# Patient Record
Sex: MALE | Race: BLACK OR AFRICAN AMERICAN | NOT HISPANIC OR LATINO | URBAN - METROPOLITAN AREA
[De-identification: names, ages, dates, MRNs, and addresses within clinical notes are randomized per-mention and may not be internally consistent; named-entity substitution may affect disease eponyms.]

---

## 2017-03-25 ENCOUNTER — EMERGENCY (EMERGENCY)
Facility: HOSPITAL | Age: 22
LOS: 1 days | Discharge: HOME | End: 2017-03-25
Attending: EMERGENCY MEDICINE | Admitting: EMERGENCY MEDICINE
Payer: COMMERCIAL

## 2017-03-25 VITALS
TEMPERATURE: 98 F | SYSTOLIC BLOOD PRESSURE: 160 MMHG | RESPIRATION RATE: 15 BRPM | HEART RATE: 79 BPM | OXYGEN SATURATION: 978 % | DIASTOLIC BLOOD PRESSURE: 84 MMHG

## 2017-03-25 VITALS — OXYGEN SATURATION: 99 %

## 2017-03-25 DIAGNOSIS — M54.2 CERVICALGIA: ICD-10-CM
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
## 2017-03-25 DIAGNOSIS — F41.9 ANXIETY DISORDER, UNSPECIFIED: ICD-10-CM

## 2017-03-25 DIAGNOSIS — S30.810A ABRASION OF LOWER BACK AND PELVIS, INITIAL ENCOUNTER: ICD-10-CM
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
## 2017-03-25 DIAGNOSIS — S83.8X1A SPRAIN OF OTHER SPECIFIED PARTS OF RIGHT KNEE, INITIAL ENCOUNTER: ICD-10-CM

## 2017-03-25 DIAGNOSIS — S00.212A ABRASION OF LEFT EYELID AND PERIOCULAR AREA, INITIAL ENCOUNTER: ICD-10-CM

## 2017-03-25 PROCEDURE — 99283 EMERGENCY DEPT VISIT LOW MDM: CPT | Mod: 25

## 2017-03-25 PROCEDURE — 99283 EMERGENCY DEPT VISIT LOW MDM: CPT

## 2017-03-25 PROCEDURE — 73562 X-RAY EXAM OF KNEE 3: CPT | Mod: 26,RT

## 2017-03-25 PROCEDURE — 73562 X-RAY EXAM OF KNEE 3: CPT

## 2017-03-25 RX ORDER — ACETAMINOPHEN 500 MG
1000 TABLET ORAL ONCE
Qty: 0 | Refills: 0 | Status: COMPLETED | OUTPATIENT
Start: 2017-03-25 | End: 2017-03-25

## 2017-03-25 RX ORDER — IBUPROFEN 200 MG
600 TABLET ORAL ONCE
Qty: 0 | Refills: 0 | Status: COMPLETED | OUTPATIENT
Start: 2017-03-25 | End: 2017-03-25

## 2017-03-25 RX ADMIN — Medication 600 MILLIGRAM(S): at 18:36

## 2017-03-25 RX ADMIN — Medication 600 MILLIGRAM(S): at 18:46

## 2017-03-25 RX ADMIN — Medication 1000 MILLIGRAM(S): at 18:46

## 2017-03-25 RX ADMIN — Medication 1000 MILLIGRAM(S): at 18:36

## 2017-03-25 NOTE — ED PROVIDER NOTE - PHYSICAL EXAMINATION
Gen: NAD, AOx3, anxious  Head: NC, superficial abrasion left eye lid and inferior left eye, no bony ttp orbits or facial bones, jaw in line  HEENT: PERRL, oral mucosa moist, normal conjunctiva, neck supple  Lung: no respiratory distress  CV: Normal perfusion  Abd: soft, NTND  MSK: No edema, no visible deformities, +ttp rt patella no laxity or ttp of MCL/LCL. ACL intact. no trauma to Lt LE, pelvis stable, no midline vertebral ttp, FROM neck, +ttp left trapezius  Neuro: No focal neurologic deficits, faint tremor  Skin: superficial abrasion to rt palm, +abrasions b/l buttock posterior/superior to greater trochanters, +abrasion over rt knee cap, mild erythema of radial aspect of wrist- linear  Psych: normal affect

## 2017-03-25 NOTE — ED ADULT NURSE REASSESSMENT NOTE - NS ED NURSE REASSESS COMMENT FT1
report received from dorothy oropeza. pt resting comfortably in bed. safety maintained. family at bedside. pt states "the pain is much better". waiting on xray results. will continue to monitor.
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
## 2017-03-25 NOTE — ED PROVIDER NOTE - MEDICAL DECISION MAKING DETAILS
no suspicion of significant head/facial trauma. will r/o patella fx, tetanus UTD. pain control. Xray D/C

## 2017-03-25 NOTE — ED PROVIDER NOTE - CARE PLAN
Principal Discharge DX:	Abrasions of multiple sites  Instructions for follow-up, activity and diet:	1. Return to ED for worsening, progressive or any other concerning symptoms   2. Follow up with your primary care doctor in 2-3days   3. Take motrin 600mg every 6 hours as needed for pain and or Take Tylenol up to 650 mg every 6 hours as needed for pain.   4. Rest, apply ice over covered skin for no more than 15 minutes at a time, keep affected extremity elevated.  Secondary Diagnosis:	Sprain of other ligament of right knee, initial encounter

## 2017-03-25 NOTE — ED PROVIDER NOTE - PLAN OF CARE
1. Return to ED for worsening, progressive or any other concerning symptoms   2. Follow up with your primary care doctor in 2-3days   3. Take motrin 600mg every 6 hours as needed for pain and or Take Tylenol up to 650 mg every 6 hours as needed for pain.   4. Rest, apply ice over covered skin for no more than 15 minutes at a time, keep affected extremity elevated.
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
## 2017-05-04 ENCOUNTER — EMERGENCY (EMERGENCY)
Facility: HOSPITAL | Age: 22
LOS: 1 days | Discharge: HOME | End: 2017-05-04
Attending: EMERGENCY MEDICINE
Payer: COMMERCIAL

## 2017-05-04 VITALS
RESPIRATION RATE: 16 BRPM | DIASTOLIC BLOOD PRESSURE: 87 MMHG | SYSTOLIC BLOOD PRESSURE: 135 MMHG | OXYGEN SATURATION: 100 % | HEART RATE: 66 BPM

## 2017-05-04 VITALS
RESPIRATION RATE: 18 BRPM | TEMPERATURE: 98 F | HEART RATE: 74 BPM | SYSTOLIC BLOOD PRESSURE: 157 MMHG | DIASTOLIC BLOOD PRESSURE: 90 MMHG | OXYGEN SATURATION: 99 %

## 2017-05-04 DIAGNOSIS — F41.9 ANXIETY DISORDER, UNSPECIFIED: ICD-10-CM

## 2017-05-04 DIAGNOSIS — R06.02 SHORTNESS OF BREATH: ICD-10-CM

## 2017-05-04 DIAGNOSIS — R07.9 CHEST PAIN, UNSPECIFIED: ICD-10-CM

## 2017-05-04 LAB
AMPHET UR-MCNC: NEGATIVE — SIGNIFICANT CHANGE UP
ANION GAP SERPL CALC-SCNC: 12 MMOL/L — SIGNIFICANT CHANGE UP (ref 5–17)
APAP SERPL-MCNC: <15 UG/ML — SIGNIFICANT CHANGE UP (ref 10–30)
APPEARANCE UR: CLEAR — SIGNIFICANT CHANGE UP
BARBITURATES UR SCN-MCNC: NEGATIVE — SIGNIFICANT CHANGE UP
BASOPHILS # BLD AUTO: 0 K/UL — SIGNIFICANT CHANGE UP (ref 0–0.2)
BASOPHILS NFR BLD AUTO: 0.3 % — SIGNIFICANT CHANGE UP (ref 0–2)
BENZODIAZ UR-MCNC: POSITIVE
BILIRUB UR-MCNC: NEGATIVE — SIGNIFICANT CHANGE UP
BUN SERPL-MCNC: 11 MG/DL — SIGNIFICANT CHANGE UP (ref 7–23)
CALCIUM SERPL-MCNC: 9.8 MG/DL — SIGNIFICANT CHANGE UP (ref 8.4–10.5)
CHLORIDE SERPL-SCNC: 104 MMOL/L — SIGNIFICANT CHANGE UP (ref 96–108)
CO2 SERPL-SCNC: 25 MMOL/L — SIGNIFICANT CHANGE UP (ref 22–31)
COCAINE METAB.OTHER UR-MCNC: NEGATIVE — SIGNIFICANT CHANGE UP
COLOR SPEC: SIGNIFICANT CHANGE UP
CREAT SERPL-MCNC: 0.98 MG/DL — SIGNIFICANT CHANGE UP (ref 0.5–1.3)
DIFF PNL FLD: NEGATIVE — SIGNIFICANT CHANGE UP
EOSINOPHIL # BLD AUTO: 0 K/UL — SIGNIFICANT CHANGE UP (ref 0–0.5)
EOSINOPHIL NFR BLD AUTO: 0.4 % — SIGNIFICANT CHANGE UP (ref 0–6)
ETHANOL SERPL-MCNC: SIGNIFICANT CHANGE UP MG/DL (ref 0–10)
GLUCOSE SERPL-MCNC: 93 MG/DL — SIGNIFICANT CHANGE UP (ref 70–99)
GLUCOSE UR QL: NEGATIVE — SIGNIFICANT CHANGE UP
HCT VFR BLD CALC: 38.9 % — LOW (ref 39–50)
HGB BLD-MCNC: 13.2 G/DL — SIGNIFICANT CHANGE UP (ref 13–17)
KETONES UR-MCNC: NEGATIVE — SIGNIFICANT CHANGE UP
LEUKOCYTE ESTERASE UR-ACNC: NEGATIVE — SIGNIFICANT CHANGE UP
LYMPHOCYTES # BLD AUTO: 1.7 K/UL — SIGNIFICANT CHANGE UP (ref 1–3.3)
LYMPHOCYTES # BLD AUTO: 26.8 % — SIGNIFICANT CHANGE UP (ref 13–44)
MCHC RBC-ENTMCNC: 29.1 PG — SIGNIFICANT CHANGE UP (ref 27–34)
MCHC RBC-ENTMCNC: 33.9 GM/DL — SIGNIFICANT CHANGE UP (ref 32–36)
MCV RBC AUTO: 85.7 FL — SIGNIFICANT CHANGE UP (ref 80–100)
METHADONE UR-MCNC: NEGATIVE — SIGNIFICANT CHANGE UP
MONOCYTES # BLD AUTO: 0.5 K/UL — SIGNIFICANT CHANGE UP (ref 0–0.9)
MONOCYTES NFR BLD AUTO: 8.3 % — SIGNIFICANT CHANGE UP (ref 2–14)
NEUTROPHILS # BLD AUTO: 4 K/UL — SIGNIFICANT CHANGE UP (ref 1.8–7.4)
NEUTROPHILS NFR BLD AUTO: 64.2 % — SIGNIFICANT CHANGE UP (ref 43–77)
NITRITE UR-MCNC: NEGATIVE — SIGNIFICANT CHANGE UP
OPIATES UR-MCNC: NEGATIVE — SIGNIFICANT CHANGE UP
OXYCODONE UR-MCNC: NEGATIVE — SIGNIFICANT CHANGE UP
PCP SPEC-MCNC: SIGNIFICANT CHANGE UP
PCP UR-MCNC: NEGATIVE — SIGNIFICANT CHANGE UP
PH UR: 6.5 — SIGNIFICANT CHANGE UP (ref 5–8)
PLATELET # BLD AUTO: 252 K/UL — SIGNIFICANT CHANGE UP (ref 150–400)
POTASSIUM SERPL-MCNC: 3.9 MMOL/L — SIGNIFICANT CHANGE UP (ref 3.5–5.3)
POTASSIUM SERPL-SCNC: 3.9 MMOL/L — SIGNIFICANT CHANGE UP (ref 3.5–5.3)
PROT UR-MCNC: NEGATIVE — SIGNIFICANT CHANGE UP
RBC # BLD: 4.54 M/UL — SIGNIFICANT CHANGE UP (ref 4.2–5.8)
RBC # FLD: 11.4 % — SIGNIFICANT CHANGE UP (ref 10.3–14.5)
SALICYLATES SERPL-MCNC: <2 MG/DL — LOW (ref 15–30)
SODIUM SERPL-SCNC: 141 MMOL/L — SIGNIFICANT CHANGE UP (ref 135–145)
SP GR SPEC: 1.01 — SIGNIFICANT CHANGE UP (ref 1.01–1.02)
THC UR QL: POSITIVE
UROBILINOGEN FLD QL: NEGATIVE — SIGNIFICANT CHANGE UP
WBC # BLD: 6.2 K/UL — SIGNIFICANT CHANGE UP (ref 3.8–10.5)
WBC # FLD AUTO: 6.2 K/UL — SIGNIFICANT CHANGE UP (ref 3.8–10.5)

## 2017-05-04 PROCEDURE — 80307 DRUG TEST PRSMV CHEM ANLYZR: CPT

## 2017-05-04 PROCEDURE — 93005 ELECTROCARDIOGRAM TRACING: CPT

## 2017-05-04 PROCEDURE — 99284 EMERGENCY DEPT VISIT MOD MDM: CPT | Mod: 25

## 2017-05-04 PROCEDURE — 85027 COMPLETE CBC AUTOMATED: CPT

## 2017-05-04 PROCEDURE — 80048 BASIC METABOLIC PNL TOTAL CA: CPT

## 2017-05-04 PROCEDURE — 99283 EMERGENCY DEPT VISIT LOW MDM: CPT | Mod: 25

## 2017-05-04 PROCEDURE — 93010 ELECTROCARDIOGRAM REPORT: CPT

## 2017-05-04 PROCEDURE — 81003 URINALYSIS AUTO W/O SCOPE: CPT

## 2017-05-04 NOTE — ED ADULT TRIAGE NOTE - CHIEF COMPLAINT QUOTE
anxious, pt states he used to buy xanax "illegally" then became addicted and stopped. pt states he has been feeling anxious and bought a xanax yesterday, denies si/hi/hallucinations.

## 2017-05-04 NOTE — ED PROVIDER NOTE - DETAILS:
[I agree with scribe documentation except where as noted below]   21 yom pmhx anxiety (self-diagnosed,  has never formally been evaluated by a psychiatrist) presents with ongoing anxiety.  approx 1 year ago started buying xanax off the streets and was taking up to 7 tabs of xanax daily.  enrolled in an outpt treatment program ([facility] in [place]) and has been 'clean' for 3 months.  since stopping xanax has increased feelings of anxiety, difficulty speaking to people due to anxiety, and intermittent cp/sob when he is feeling anxious.  did buy xanax off the streets and take some yesterday because he was feeling more anxious than usual. presented to urgent care today requesting xanax rx for his anxiety as he feels the xanax helps him, and he would rather take 'legal' xanax than buy it off the streets per pt. no current cp or sob at this time. no fam hx of cad, nonsmoker. denies iv drug use, states xanax and marijuana are only drugs he is using at this time. no hx of withdrawal in the past,  has not used any xanax for 3 months until yesterday.    ROS:   constitutional - no fever, no chills  eyes - no visual changes, no redness  eent - no sore throat, no nasal congestion  cvs - + intermittent chest pain, no leg swelling  resp - no shortness of breath, no cough  gi - no abdominal pain, no vomiting, no diarrhea  gu - no dysuria, no hematuria  msk - no acute back pain, no joint swelling  skin - no rashes, no jaundice  neuro - no headache, no focal weakness  psych - + anxiety    ROS:   constitutional - no fever, no chills  eyes - no visual changes, no redness  eent - no sore throat, no nasal congestion  cvs - no chest pain, no leg swelling  resp - no shortness of breath, no cough  gi - no abdominal pain, no vomiting, no diarrhea  gu - no dysuria, no hematuria  msk - no acute back pain, no joint swelling  skin - no rashes, no jaundice  neuro - no headache, no focal weakness  psych - no acute mental health issue, mood and affect wnl, no SI or HI at this time. no tremors or tongue fasciculations    pt without any acute psychiatric issue at this time - no SI or HI, no acute severe anxiety. mood and affect appear wnl. pt w/o active cp at this time, doubt acs or cardiac cause of pt's chest pain and cp/sob are associated with episodes of anxiety. no signs or sxs of benzo withdrawal at this time. pt is requesting a rx for xanax at this time - counseled re need for formal psychiatric evaluation prior to administering medications for chronic anxiety particularly in setting of benzo abuse. discussed with psychiatry who recommended outpt referral to dual diagnosis clinic at [facility]. pt counseled re need [place] [facility] advised to discontinue illicit drug use. additional verbal instructions regarding diagnosis, return precautions and follow up plan given to pt and/or family. SYDNEY Dobbs MD

## 2017-05-04 NOTE — ED ADULT NURSE NOTE - OBJECTIVE STATEMENT
21y male pt, no PMH, arrived to ED c/o being anxious with episodes of sob and chest tightness. Pt states that he used to take Xanax illegally, has been following with clinic in order to stop taking Xanax. Pt was anxious yesterday and admit to take 1 dose of 2mg of Xanax yesterday. Pt went to [facility] today to get a prescription but was advised to come to ED for further evaluation. No SI/HI, appears anxious but cooperative, no other drug use, occasional marijuana use, no fever/chills no neuro deficits noted.

## 2017-05-04 NOTE — ED PROVIDER NOTE - INTERPRETATION
normal sinus rhythm, Normal axis, Normal VA interval and QRS complex. There are no acute ischemic ST or T-wave changes.

## 2017-05-04 NOTE — ED PROVIDER NOTE - NS ED MD SCRIBE ATTENDING SCRIBE SECTIONS
REVIEW OF SYSTEMS/PHYSICAL EXAM/HIV/HISTORY OF PRESENT ILLNESS/VITAL SIGNS( Pullset)/INTAKE ASSESSMENT/SCREENINGS/PAST MEDICAL/SURGICAL/SOCIAL HISTORY

## 2017-05-04 NOTE — ED PROVIDER NOTE - OBJECTIVE STATEMENT
21 year old male with no significant PMHx, presents with complaint of anxiety attacks. States anxiety attacks sometimes occur with episodes of SOB, CP, nervousness and difficulty speaking clearly. Episodes are relieved with Xanax. Pt admits to hx of Xanax abuse. Pt states episodes of anxiety attacks have been occurring since he stopped taking Xanax. Is in outpatient rehab currently and was clean for 3 months prior but took 1 2mg tab Xanax yesterday. Used to take up to 7 tabs a day prior. Pt does not have prescription for Xanax and has never been evaluated by a psychiatrist. States he buys it off the streets. Went to Urgent Care today to get a prescription but was advised to present to the ED. Denies SI/HI. No IV drug abuse. Admits to occasional marijuana use. Denies CP, SOB currently. No recent fevers, chills, N/V, abdominal pain, back pain, urinary changes.

## 2017-08-11 ENCOUNTER — INPATIENT (INPATIENT)
Facility: HOSPITAL | Age: 22
LOS: 0 days | DRG: 955 | End: 2017-08-12
Attending: SURGERY | Admitting: SURGERY
Payer: COMMERCIAL

## 2017-08-11 VITALS
RESPIRATION RATE: 16 BRPM | OXYGEN SATURATION: 100 % | DIASTOLIC BLOOD PRESSURE: 138 MMHG | HEART RATE: 90 BPM | SYSTOLIC BLOOD PRESSURE: 211 MMHG | TEMPERATURE: 98 F | WEIGHT: 160.94 LBS

## 2017-08-11 VITALS
DIASTOLIC BLOOD PRESSURE: 113 MMHG | HEART RATE: 100 BPM | OXYGEN SATURATION: 100 % | RESPIRATION RATE: 23 BRPM | SYSTOLIC BLOOD PRESSURE: 191 MMHG

## 2017-08-11 DIAGNOSIS — T14.90 INJURY, UNSPECIFIED: ICD-10-CM

## 2017-08-11 LAB
ALBUMIN SERPL ELPH-MCNC: 4.7 G/DL — SIGNIFICANT CHANGE UP (ref 3.3–5)
ALP SERPL-CCNC: 85 U/L — SIGNIFICANT CHANGE UP (ref 40–120)
ALT FLD-CCNC: 38 U/L RC — SIGNIFICANT CHANGE UP (ref 10–45)
AMYLASE P1 CFR SERPL: 68 U/L — SIGNIFICANT CHANGE UP (ref 25–125)
ANION GAP SERPL CALC-SCNC: 24 MMOL/L — HIGH (ref 5–17)
APPEARANCE UR: CLEAR — SIGNIFICANT CHANGE UP
APTT BLD: 39.5 SEC — HIGH (ref 27.5–37.4)
AST SERPL-CCNC: 54 U/L — HIGH (ref 10–40)
BASE EXCESS BLDV CALC-SCNC: -6.2 MMOL/L — LOW (ref -2–2)
BASOPHILS # BLD AUTO: 0 K/UL — SIGNIFICANT CHANGE UP (ref 0–0.2)
BASOPHILS NFR BLD AUTO: 0.4 % — SIGNIFICANT CHANGE UP (ref 0–2)
BILIRUB SERPL-MCNC: 0.3 MG/DL — SIGNIFICANT CHANGE UP (ref 0.2–1.2)
BILIRUB UR-MCNC: NEGATIVE — SIGNIFICANT CHANGE UP
BLD GP AB SCN SERPL QL: NEGATIVE — SIGNIFICANT CHANGE UP
BUN SERPL-MCNC: 21 MG/DL — SIGNIFICANT CHANGE UP (ref 7–23)
CA-I SERPL-SCNC: 1.18 MMOL/L — SIGNIFICANT CHANGE UP (ref 1.12–1.3)
CALCIUM SERPL-MCNC: 9.8 MG/DL — SIGNIFICANT CHANGE UP (ref 8.4–10.5)
CHLORIDE BLDV-SCNC: 105 MMOL/L — SIGNIFICANT CHANGE UP (ref 96–108)
CHLORIDE SERPL-SCNC: 101 MMOL/L — SIGNIFICANT CHANGE UP (ref 96–108)
CO2 BLDV-SCNC: 19 MMOL/L — LOW (ref 22–30)
CO2 SERPL-SCNC: 15 MMOL/L — LOW (ref 22–31)
COLOR SPEC: COLORLESS — SIGNIFICANT CHANGE UP
CREAT SERPL-MCNC: 1.27 MG/DL — SIGNIFICANT CHANGE UP (ref 0.5–1.3)
DIFF PNL FLD: ABNORMAL
EOSINOPHIL # BLD AUTO: 0 K/UL — SIGNIFICANT CHANGE UP (ref 0–0.5)
EOSINOPHIL NFR BLD AUTO: 0.5 % — SIGNIFICANT CHANGE UP (ref 0–6)
ETHANOL SERPL-MCNC: SIGNIFICANT CHANGE UP MG/DL (ref 0–10)
GAS PNL BLDA: SIGNIFICANT CHANGE UP
GAS PNL BLDV: 138 MMOL/L — SIGNIFICANT CHANGE UP (ref 136–145)
GAS PNL BLDV: SIGNIFICANT CHANGE UP
GLUCOSE BLDV-MCNC: 154 MG/DL — HIGH (ref 70–99)
GLUCOSE SERPL-MCNC: 175 MG/DL — HIGH (ref 70–99)
GLUCOSE UR QL: 50
HCO3 BLDV-SCNC: 18 MMOL/L — LOW (ref 21–29)
HCT VFR BLD CALC: 40.1 % — SIGNIFICANT CHANGE UP (ref 39–50)
HCT VFR BLDA CALC: 43 % — SIGNIFICANT CHANGE UP (ref 39–50)
HGB BLD CALC-MCNC: 14 G/DL — SIGNIFICANT CHANGE UP (ref 13–17)
HGB BLD-MCNC: 13.7 G/DL — SIGNIFICANT CHANGE UP (ref 13–17)
INR BLD: 1.27 RATIO — HIGH (ref 0.88–1.16)
KETONES UR-MCNC: NEGATIVE — SIGNIFICANT CHANGE UP
LACTATE BLDV-MCNC: 6.7 MMOL/L — CRITICAL HIGH (ref 0.7–2)
LEUKOCYTE ESTERASE UR-ACNC: NEGATIVE — SIGNIFICANT CHANGE UP
LIDOCAIN IGE QN: 22 U/L — SIGNIFICANT CHANGE UP (ref 7–60)
LYMPHOCYTES # BLD AUTO: 3.1 K/UL — SIGNIFICANT CHANGE UP (ref 1–3.3)
LYMPHOCYTES # BLD AUTO: 32.5 % — SIGNIFICANT CHANGE UP (ref 13–44)
MCHC RBC-ENTMCNC: 29.9 PG — SIGNIFICANT CHANGE UP (ref 27–34)
MCHC RBC-ENTMCNC: 34.2 GM/DL — SIGNIFICANT CHANGE UP (ref 32–36)
MCV RBC AUTO: 87.4 FL — SIGNIFICANT CHANGE UP (ref 80–100)
MONOCYTES # BLD AUTO: 0.7 K/UL — SIGNIFICANT CHANGE UP (ref 0–0.9)
MONOCYTES NFR BLD AUTO: 6.8 % — SIGNIFICANT CHANGE UP (ref 2–14)
NEUTROPHILS # BLD AUTO: 5.8 K/UL — SIGNIFICANT CHANGE UP (ref 1.8–7.4)
NEUTROPHILS NFR BLD AUTO: 59.8 % — SIGNIFICANT CHANGE UP (ref 43–77)
NITRITE UR-MCNC: NEGATIVE — SIGNIFICANT CHANGE UP
OTHER CELLS CSF MANUAL: 16 ML/DL — LOW (ref 18–22)
PCO2 BLDV: 35 MMHG — SIGNIFICANT CHANGE UP (ref 35–50)
PH BLDV: 7.34 — LOW (ref 7.35–7.45)
PH UR: 6.5 — SIGNIFICANT CHANGE UP (ref 5–8)
PLATELET # BLD AUTO: 242 K/UL — SIGNIFICANT CHANGE UP (ref 150–400)
PO2 BLDV: 52 MMHG — HIGH (ref 25–45)
POTASSIUM BLDV-SCNC: 3.1 MMOL/L — LOW (ref 3.5–5)
POTASSIUM SERPL-MCNC: 3.4 MMOL/L — LOW (ref 3.5–5.3)
POTASSIUM SERPL-SCNC: 3.4 MMOL/L — LOW (ref 3.5–5.3)
PROT SERPL-MCNC: 7.5 G/DL — SIGNIFICANT CHANGE UP (ref 6–8.3)
PROT UR-MCNC: 300 MG/DL
PROTHROM AB SERPL-ACNC: 13.9 SEC — HIGH (ref 9.8–12.7)
RBC # BLD: 4.59 M/UL — SIGNIFICANT CHANGE UP (ref 4.2–5.8)
RBC # FLD: 11 % — SIGNIFICANT CHANGE UP (ref 10.3–14.5)
RH IG SCN BLD-IMP: POSITIVE — SIGNIFICANT CHANGE UP
SAO2 % BLDV: 82 % — SIGNIFICANT CHANGE UP (ref 67–88)
SODIUM SERPL-SCNC: 140 MMOL/L — SIGNIFICANT CHANGE UP (ref 135–145)
SP GR SPEC: 1.02 — SIGNIFICANT CHANGE UP (ref 1.01–1.02)
UROBILINOGEN FLD QL: NEGATIVE — SIGNIFICANT CHANGE UP
WBC # BLD: 9.6 K/UL — SIGNIFICANT CHANGE UP (ref 3.8–10.5)
WBC # FLD AUTO: 9.6 K/UL — SIGNIFICANT CHANGE UP (ref 3.8–10.5)

## 2017-08-11 PROCEDURE — 72125 CT NECK SPINE W/O DYE: CPT | Mod: 26

## 2017-08-11 PROCEDURE — 72128 CT CHEST SPINE W/O DYE: CPT | Mod: 26

## 2017-08-11 PROCEDURE — 99291 CRITICAL CARE FIRST HOUR: CPT

## 2017-08-11 PROCEDURE — 99291 CRITICAL CARE FIRST HOUR: CPT | Mod: 25

## 2017-08-11 PROCEDURE — 74177 CT ABD & PELVIS W/CONTRAST: CPT | Mod: 26

## 2017-08-11 PROCEDURE — 70486 CT MAXILLOFACIAL W/O DYE: CPT | Mod: 26

## 2017-08-11 PROCEDURE — 71260 CT THORAX DX C+: CPT | Mod: 26

## 2017-08-11 PROCEDURE — 70450 CT HEAD/BRAIN W/O DYE: CPT | Mod: 26

## 2017-08-11 PROCEDURE — 88304 TISSUE EXAM BY PATHOLOGIST: CPT | Mod: 26

## 2017-08-11 PROCEDURE — 31500 INSERT EMERGENCY AIRWAY: CPT

## 2017-08-11 PROCEDURE — 72131 CT LUMBAR SPINE W/O DYE: CPT | Mod: 26

## 2017-08-11 PROCEDURE — 71010: CPT | Mod: 26

## 2017-08-11 RX ORDER — SODIUM CHLORIDE 9 MG/ML
1000 INJECTION INTRAMUSCULAR; INTRAVENOUS; SUBCUTANEOUS ONCE
Refills: 0 | Status: COMPLETED | OUTPATIENT
Start: 2017-08-11 | End: 2017-08-11

## 2017-08-11 RX ORDER — ETOMIDATE 2 MG/ML
20 INJECTION INTRAVENOUS ONCE
Refills: 0 | Status: COMPLETED | OUTPATIENT
Start: 2017-08-11 | End: 2017-08-11

## 2017-08-11 RX ORDER — MANNITOL
199.8 POWDER (GRAM) MISCELLANEOUS
Qty: 100 | Refills: 0 | Status: DISCONTINUED | OUTPATIENT
Start: 2017-08-11 | End: 2017-08-11

## 2017-08-11 RX ORDER — MORPHINE SULFATE 50 MG/1
4 CAPSULE, EXTENDED RELEASE ORAL ONCE
Refills: 0 | Status: DISCONTINUED | OUTPATIENT
Start: 2017-08-11 | End: 2017-08-11

## 2017-08-11 RX ORDER — ROCURONIUM BROMIDE 10 MG/ML
100 VIAL (ML) INTRAVENOUS ONCE
Refills: 0 | Status: COMPLETED | OUTPATIENT
Start: 2017-08-11 | End: 2017-08-11

## 2017-08-11 RX ADMIN — SODIUM CHLORIDE 1000 MILLILITER(S): 9 INJECTION INTRAMUSCULAR; INTRAVENOUS; SUBCUTANEOUS at 21:32

## 2017-08-11 RX ADMIN — MORPHINE SULFATE 4 MILLIGRAM(S): 50 CAPSULE, EXTENDED RELEASE ORAL at 21:31

## 2017-08-11 RX ADMIN — Medication 999 GM/HR: at 22:10

## 2017-08-11 RX ADMIN — Medication 100 MILLIGRAM(S): at 21:32

## 2017-08-11 RX ADMIN — ETOMIDATE 20 MILLIGRAM(S): 2 INJECTION INTRAVENOUS at 21:33

## 2017-08-11 NOTE — ED PROVIDER NOTE - PROGRESS NOTE DETAILS
ATTD: patient combative and confused. concern for his safety, projected course and ability to obtain sufficient imaging for concerned diagosis, patient was intubated for airway protection. ATTD: FAST scan done at bedside, no free fluid noted. transported to Ct for imaging.
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
## 2017-08-11 NOTE — ED PROCEDURE NOTE - PROCEDURE ADDITIONAL DETAILS
Patient preoxygenated with nasal canula and BVM. Positioned patient at head of bed. c-collar removed with in-line stabilization by surgical resident. Patient given 20mg etomidate and 100mg rocuronium. Utilizing glidescope 1st attempt unsuccessful. Airway suctioned. 2nd attempt successful with visualization of tube through cords. Verified with color change, b/l breath sounds, condensation in tube. Tube advanced 1cm after cxr, 26 at the teeth.
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
## 2017-08-11 NOTE — H&P ADULT - NSHPLABSRESULTS_GEN_ALL_CORE
VBG lactate 6.7  U/A +protein  -EtOH    CT:  Trace right pneumothorax  Large acute hematoma in the deep paraspinal subcutaneous   tissues of the lumbosacral region measuring approximately 15 x 13 x 3.3   cm (CC x TR x  AP). There are focal hyperdense areas within the hematoma   compatible with active contrast extravasation, although a bleeding vessel   was not identified.    CT Head official read pending;  8mm midline shift; frontal lobe contusion
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
## 2017-08-11 NOTE — H&P ADULT - NSHPPHYSICALEXAM_GEN_ALL_CORE
Constitutional: Well-developed well nourished male in distress; noted initially to move all extremities spontaneously prior to intubation/sedation  HEENT: Head is normocephalic; bogginess noted to posterior occiput, no scalp lacerations, maxillofacial structures stable, abrasion over right eyebrow, bloody discharge from oral cavity, no hernandez sign / racoon eyes, unable to assess EOM b/l, pupils 4 mm round and reactive to light b/l on initial exam, no active drainage or redness from eyes  Neck: cervical collar in place, trachea midline  Respiratory: Breath sounds CTA b/l respirations are unlabored, no accessory muscle use, no conversational dyspnea  Cardiovascular: Regular rate & rhythm, +S1, S2  Chest: Unable to assess chest wall tenderness, no subQ emphysema or crepitus palpated.  Gastrointestinal: Abdomen soft, non-tender, non-distended, no rebound tenderness / guarding, no ecchymosis or external signs of abdominal trauma  Extremities: Prior to intubation, moving all extremities spontaneously. Right anterior shoulder with hematoma and overlying abrasion.   Pelvis: stable  Vascular: 2+ radial, femoral, and DP pulses b/l  Neurological: GCS: 15 (4/5/6). A&O x 3; no gross sensory / motor / coordination deficits  Musculoskeletal: 5/5 strength of upper and lower extremities b/l  Back: no C/T/LS spine tenderness to palpation, no step-offs or signs of external trauma to the back Constitutional: Well-developed well nourished male in distress; noted initially to move all extremities spontaneously prior to intubation/sedation  HEENT: Head is normocephalic; bogginess noted to posterior occiput, no scalp lacerations, maxillofacial structures stable, abrasion over right eyebrow, bloody discharge from oral cavity, no hernandez sign / raccoon eyes, unable to assess EOM b/l, pupils 4 mm round and reactive to light b/l on initial exam, no active drainage or redness from eyes  Neck: cervical collar in place, trachea midline  Respiratory: Breath sounds CTA b/l respirations are unlabored, no accessory muscle use, no conversational dyspnea  Cardiovascular: Regular rate & rhythm, +S1, S2  Chest: Unable to assess chest wall tenderness, no subQ emphysema or crepitus palpated. No obvious deformities of clavicles or chest wall.  Gastrointestinal: Abdomen soft, non-distended, no ecchymosis or external signs of abdominal trauma  Extremities: Prior to intubation, moving all extremities spontaneously. Right anterior shoulder with hematoma and overlying abrasion. No obvious deformities of long bones  Pelvis: stable  Vascular: 2+ radial DP pulses b/l  Neurological: GCS: 13 (3/4/6) prior to intubation; following intubation, patient sedated   Musculoskeletal: Unable to assess strength  Back: No palpable step offs. Boggy occiput. Abrasions to back extending from shoulders to sacrum. Hematoma overlying sacrum.   Perineum: No blood noted
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
## 2017-08-12 NOTE — DISCHARGE NOTE FOR THE EXPIRED PATIENT - OTHER SIGNIFICANT FINDINGS
< from: CT Maxillofacial No Cont (08.11.17 @ 22:09) >  There is a small right acute mixed density holohemispheric subdural   hematoma, measuring 1 cm in maximum thickness, with mild parenchymal mass   effect. There is right high anterior parafalcine subdural hematoma/   hemorrhagic contusion with associated mass effect. There is an acute left   anterior parafalcine subdural hematoma measuring 9 mm in maximum   thickness on the left with associated mass effect. There is a hemorrhagic   contusion in the right inferior frontal region (image 18, series 2) and   another hemorrhagic contusion in the left cerebellar hemisphere (image 6,   series 2    There is diffuse cerebral edema and sulcal effacement. There is a right   to left midline shift measuring 9 mm. There is right uncal herniation.   There is near complete effacement of the right lateral ventricle. There   is no hydrocephalus.    There are small areas of acute subarachnoid hemorrhage in the bilateral   frontoparietal convexities, bilateral cerebellar, basal cisterns and   foramen magnum.    There are multiple foci of air in the posterior fossa, basal cisterns and   bifrontal subdural spaces due to multiple skull base fractures, described   below.    There is a nondisplaced fracture of the left occipital bone extending   inferiorly through the midline to the foramen magnum with a 3 mm   distraction. There are nondisplaced right occipital bone left   parieto-occipital fractures. There are nondisplaced, mildly comminuted   fractures of the bilateral occipital condyles, with a 2 mm distraction on   the left (image 5, series 3).     There are nondisplaced fractures of the bilateral sphenoid bones   involving the bilateral sphenoid sinuses. The pterygoid plates are intact.    There are nondisplaced fractures through the petrous portions of the   bilateral temporal bones involving the carotid canals with opacification   of the petrous air cells. There are small bilateral tympanomastoid   effusions.    There is complete opacification of the bilateral sphenoid sinuses and   mild opacification of the bilateral ethmoid sinuses which appear mildly   hyperdense and likely hemorrhagic. There are small bilateral ethmoid   sinus air-fluid levels. There are secretions in the nasopharynx and   bilateral nasal cavities nonspecific in the presence of an indwelling   endotracheal tube.    There is mild right periorbital soft tissue swelling. There is a   nondisplaced fracture through the roof of the right orbit. There is a   hematoma in the right superior extraconal orbit resulting in mild mass   effect on the right superior rectus muscle. The globes are intact. The   lenses are located. There is no retrobulbar hematoma. The optic nerve   sheath complexes and remaining extraocular muscles are normal.    The temporomandibular joints are intact.    Cervical spine CT:    There is straightening of the normal cervical lordosis which may be due   to muscle spasm or positioning.    Vertebral body heights and alignment are maintained. The craniocervical   junction and atlantoaxial interval are intact. There is no acute cervical   spine fracture.    The intervertebral disc space heights are maintained. There is no   significant osseous spinal or neuroforaminal stenosis.     There is no prevertebral soft tissue swelling. There are small foci of   air in the right retropharyngeal soft tissues and in the upper spinal   canal.    The paraspinal soft tissues are unremarkable.    There are small patchy opacities in the right upper lobe which may   represent contusions. An endotracheal tube is noted. There are small   secretions in the trachea.      IMPRESSION:  1. Head/maxillofacial CT: Acute subdural and subarachnoid hemorrhages as   well as hemorrhagic contusions as described. 9 mm right to left midline   shift. Right uncal herniation. Pneumocephalus. No hydrocephalus. Acute   left parietal and bilateral occipital bone fractures. Acute skull base   fracturesas described above. Acute nondisplaced right orbital roof   fracture. A CT angiogram of the head and neck is recommended to evaluate   for vascular injury.    2. Cervical spine CT: No evidence for acute cervical spine fracture or   traumatic malalignment. Small right upper lobe opacities which may   represent pulmonary contusions. Please see separately dictated CT of the   chest for details.     < end of copied text >

## 2017-08-12 NOTE — CONSULT NOTE ADULT - ATTENDING COMMENTS
Patient seen and examined in operating room, head CT reviewed. Plan for right craniotomy or craniectomy, evacuation of subdural hematoma, and insertion of left intracranial pressure monitor or ventriculostomy.
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
## 2017-08-12 NOTE — BRIEF OPERATIVE NOTE - OPERATION/FINDINGS
right craniectomy for subdural hematoma. Intraoperatively, massive amounts of venous bleeding encountered. Craniectomy enlarged until entire right hemisphere was able to be inspected. No discrete area of bleeding seen, bleeding coming from all around. Patient coded on table with chest compressions and electric cardioversion unsuccessful. Patient . right craniectomy for subdural hematoma. Intraoperatively, blood pressure dropped significantly after opening of dura. Massive amounts of venous bleeding encountered from undersurface of right temporal and occipital lobes. Craniectomy enlarged until entire right hemisphere was able to be inspected. No discrete area of bleeding seen, with bleeding coming from underneath right temporal and occipital lobes. No obvious torcular, superior sagittal sinus, right transverse sinus, right sigmoid sinus, or right cavernous sinus injury. Patient coded on table with chest compressions and electric cardioversion unsuccessful. Patient declared dead.
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
## 2017-08-16 LAB — SURGICAL PATHOLOGY STUDY: SIGNIFICANT CHANGE UP

## 2018-06-02 NOTE — ED ADULT NURSE NOTE - CCCP TRG CHIEF CMPLNT
TRANSFER - IN REPORT:    [facility] [employer]  being received from ED for routine progression of care      Report consisted of patients Situation, Background, Assessment and   Recommendations(SBAR). Information from the following report(s) SBAR, Kardex, ED Summary, MAR, Recent Results, Med Rec Status and Cardiac Rhythm NSR was reviewed with the receiving nurse. Opportunity for questions and clarification was provided. Assessment completed upon patients arrival to unit and care assumed. multiple medical complaints

## 2018-12-18 NOTE — ED PROCEDURE NOTE - CPROC ED TOLERANCE1
Dr. Hermann Moody  patient ( of  89)    No showed for today's 18  appointment with Dr. Hermann Moody at 4:20 PM    No communication from the patient    3rd  No show     Past No shows:  18 and today 18    Previous Warning letter sent:  yes    Patient called and spoke with Scheduling at 4:32 PM.  Patient scheduled a follow up for 19@5:40 PM with Dr. Hermann Moody.         Patient tolerated procedure well.

## 2023-07-24 NOTE — ED PROVIDER NOTE - CAS EDP CONSULT REGARDING 1
-- DO NOT REPLY / DO NOT REPLY ALL --  -- Message is from [facility] ([facility]) --    General Patient Message: Patient stated he completed his UA Dr Lyons ordered 7/21/23. Caller has another UA and lab appointment on 7/26/23 and is wondering if Dr Lyons wants him to complete the additional UA prior to surgery 7/27/23. Please call back to advise if pt should complete the additional UA or disregard.       Caller Information       Type Contact Phone/Fax    07/24/2023 02:24 PM CDT Phone (Incoming) Carlos Lara (Self) [phone] (M)        Alternative phone number: n/a    Can a detailed message be left? Yes    Message Turnaround: [place]:    Refer to site's KB page for routing instructions    Please give this turnaround time to the caller:   \"You can expect to receive a response 1-3 business days after your provider's clinical team reviews the message\"               consult

## 2024-04-03 NOTE — ED ADULT NURSE NOTE - CADM POA CENTRAL LINE
No

## 2025-04-14 NOTE — ED PROCEDURE NOTE - CPROC ED TRACHE INTUB DETAIL1
Clarified with DREW Mock. PSR notes that fax number was already provided to [facility].    Awaiting labs to be faxed. Will f/u as needed   Patient was pre-oxygenated. An endotracheal tube (ETT) was placed through the vocal cords into the trachea. During intubation, staff applied gentle pressure to the cricoid cartilage. ETT position was confirmed by auscultation of bilateral breath sounds to all lung fields. ETCO2 level was appropriate./Patient connected to ventilator with settings as ordered.